# Patient Record
Sex: FEMALE | Race: BLACK OR AFRICAN AMERICAN | Employment: FULL TIME | URBAN - METROPOLITAN AREA
[De-identification: names, ages, dates, MRNs, and addresses within clinical notes are randomized per-mention and may not be internally consistent; named-entity substitution may affect disease eponyms.]

---

## 2021-10-28 ENCOUNTER — THERAPY VISIT (OUTPATIENT)
Dept: PHYSICAL THERAPY | Facility: CLINIC | Age: 61
End: 2021-10-28
Payer: COMMERCIAL

## 2021-10-28 DIAGNOSIS — M54.50 ACUTE MIDLINE LOW BACK PAIN WITHOUT SCIATICA: ICD-10-CM

## 2021-10-28 PROCEDURE — 97112 NEUROMUSCULAR REEDUCATION: CPT | Mod: GP | Performed by: PHYSICAL THERAPIST

## 2021-10-28 PROCEDURE — 97110 THERAPEUTIC EXERCISES: CPT | Mod: GP | Performed by: PHYSICAL THERAPIST

## 2021-10-28 PROCEDURE — 97161 PT EVAL LOW COMPLEX 20 MIN: CPT | Mod: GP | Performed by: PHYSICAL THERAPIST

## 2021-10-28 NOTE — LETTER
[facility]  [address]  [facility] [address]  [phone]    2021    Re: Ward Adhikari   :   1960  MRN:  [id number]   REFERRING PHYSICIAN:   SHWETA Pugh [facility]  Date of Initial Evaluation:  10/28/2021  Visits:  Rxs Used: 1  Reason for Referral:  Acute midline low back pain without sciatica    EVALUATION SUMMARY    Physical Therapy Initial Evaluation  Subjective:  The history is provided by the patient. No  was used.   Patient Health History  Ward Adhikari being seen for Lower backache.     Problem began: 2021 (MD order).   Problem occurred: Out of nowhere   Pain is reported as 0/10 on pain scale.  General health as reported by patient is good.  Pertinent medical history includes: none.   Red flags:  None as reported by patient.  Medical allergies: none.   Surgeries include:  None.    Current medications:  Other. Other medications details: previously was taking Acetametaphin (7-10 days; prescribed by  at [facility]).    Current occupation is .   Primary job tasks include:  Computer work, prolonged sitting and prolonged standing.                Therapist Generated HPI Evaluation  Problem details: Patient stated the pain started on 2021 for no reason.  Denies history of any previous back pain..         Type of problem:  Lumbar.    This is a chronic condition.  Condition occurred with:  Insidious onset.  Where condition occurred: for unknown reasons.  Patient reports pain:  Lumbar spine right (when she had pain).  Pain is described as other (maybe some tightness) and is intermittent.  Pain radiates to:  Gluteals right (it was shooting down- stopped after Medication). Pain is worse during the day.  Since onset symptoms are rapidly improving.  Associated symptoms:  Loss of motion/stiffness (when it happens). Symptoms are  exacerbated by other (transfers)  and relieved by heat, activity/movement, rest and NSAID's.  Re: Ward Adhikari   :   1960    Imaging testing: none.  Past treatment: none.   Restrictions due to condition include:  Working in normal job without restrictions.  Barriers include:  None as reported by patient.    Objective:  Standing Alignment:    Cervical/Thoracic:  Forward head  Shoulder/UE:  Rounded shoulders  Lumbar:  Normal       Lumbar/SI Evaluation  ROM:    AROM Lumbar:   Flexion:            Touches toes; repeat FIS= no pain; improvement in flexibility  Ext:                    WNL   Side Bend:        Left:  WNL    Right:  WNL  Rotation:           Left:  WNL    Right:  WNL  Side Glide:        Left:     Right:         Lumbar Myotomes:  normal  Lumbar DTR's:  not assessed  Lumbar Dermtomes:  not assessed  Neural Tension/Mobility:  Lumbar:  Normal    Lumbar Palpation:  normal    Assessment/Plan:    Patient is a 61 year old female with lumbar complaints.    Patient has the following significant findings with corresponding treatment plan.                Diagnosis 1:  LBP  Impaired muscle performance - home program  Decreased function - home program    Therapy Evaluation Codes:   1) History comprised of:   Personal factors that impact the plan of care:      None.    Comorbidity factors that impact the plan of care are:      None.     Medications impacting care: None.  2) Examination of Body Systems comprised of:   Body structures and functions that impact the plan of care:      Lumbar spine.   Activity limitations that impact the plan of care are:      Sitting.  3) Clinical presentation characteristics are:   Stable/Uncomplicated.  4) Decision-Making    Low complexity using standardized patient assessment instrument and/or measureable assessment of functional outcome.  Cumulative Therapy Evaluation is: Low complexity.    Previous and current functional limitations:  (See Goal Flow Sheet for this information)     Short term and Long term goals: (See Goal Flow Sheet for this information)     Communication ability:  Patient appears to be able to clearly communicate and understand verbal and written communication and follow directions correctly.  Treatment Explanation - The following has been discussed with the patient:   RX ordered/plan of care  Anticipated outcomes  Possible risks and side effects  This patient would benefit from PT intervention to resume normal activities.   Rehab potential is excellent.    Frequency:  1x for initial evaluation at this time  Duration:  For 1 visit at this time  Discharge Plan:  Achieve all LTG.  Independent in home treatment program.  Reach maximal therapeutic benefit.    Please refer to the daily flowsheet for treatment today, total treatment time and time spent performing 1:1 timed codes.           Thank you for your referral.    INQUIRIES  Therapist: SPENCER Rouse  [facility]  [address]  [facility] [address]  Phone: [phone]  Fax: [phone]

## 2021-10-28 NOTE — PROGRESS NOTES
Physical Therapy Initial Evaluation  Subjective:  The history is provided by the patient. No  was used.   Patient Health History  Ward Adhikari being seen for Lower backache.     Problem began: 9/29/2021 (MD order).   Problem occurred: Out of nowhere   Pain is reported as 0/10 on pain scale.  General health as reported by patient is good.  Pertinent medical history includes: none.   Red flags:  None as reported by patient.  Medical allergies: none.   Surgeries include:  None.    Current medications:  Other. Other medications details: previously was taking Acetametaphin (7-10 days; prescribed by  at [place]).    Current occupation is .   Primary job tasks include:  Computer work, prolonged sitting and prolonged standing.                  Therapist Generated HPI Evaluation  Problem details: Patient stated the pain started on 9/18/2021 for no reason.  Denies history of any previous back pain..         Type of problem:  Lumbar.    This is a chronic condition.  Condition occurred with:  Insidious onset.  Where condition occurred: for unknown reasons.  Patient reports pain:  Lumbar spine right (when she had pain).  Pain is described as other (maybe some tightness) and is intermittent.  Pain radiates to:  Gluteals right (it was shooting down- stopped after Medication). Pain is worse during the day.  Since onset symptoms are rapidly improving.  Associated symptoms:  Loss of motion/stiffness (when it happens). Symptoms are exacerbated by other (transfers)  and relieved by heat, activity/movement, rest and NSAID's.  Imaging testing: none.  Past treatment: none.   Restrictions due to condition include:  Working in normal job without restrictions.  Barriers include:  None as reported by patient.                        Objective:  Standing Alignment:    Cervical/Thoracic:  Forward head  Shoulder/UE:  Rounded shoulders  Lumbar:  Normal                           Lumbar/SI Evaluation  ROM:     AROM Lumbar:   Flexion:            Touches toes; repeat FIS= no pain; improvement in flexibility  Ext:                    WNL   Side Bend:        Left:  WNL    Right:  WNL  Rotation:           Left:  WNL    Right:  WNL  Side Glide:        Left:     Right:           Lumbar Myotomes:  normal            Lumbar DTR's:  not assessed        Lumbar Dermtomes:  not assessed                Neural Tension/Mobility:  Lumbar:  Normal        Lumbar Palpation:  normal                                                         General     ROS    Assessment/Plan:    Patient is a 61 year old female with lumbar complaints.    Patient has the following significant findings with corresponding treatment plan.                Diagnosis 1:  LBP  Impaired muscle performance - home program  Decreased function - home program    Therapy Evaluation Codes:   1) History comprised of:   Personal factors that impact the plan of care:      None.    Comorbidity factors that impact the plan of care are:      None.     Medications impacting care: None.  2) Examination of Body Systems comprised of:   Body structures and functions that impact the plan of care:      Lumbar spine.   Activity limitations that impact the plan of care are:      Sitting.  3) Clinical presentation characteristics are:   Stable/Uncomplicated.  4) Decision-Making    Low complexity using standardized patient assessment instrument and/or measureable assessment of functional outcome.  Cumulative Therapy Evaluation is: Low complexity.    Previous and current functional limitations:  (See Goal Flow Sheet for this information)    Short term and Long term goals: (See Goal Flow Sheet for this information)     Communication ability:  Patient appears to be able to clearly communicate and understand verbal and written communication and follow directions correctly.  Treatment Explanation - The following has been discussed with the patient:   RX ordered/plan of care  Anticipated  outcomes  Possible risks and side effects  This patient would benefit from PT intervention to resume normal activities.   Rehab potential is excellent.    Frequency:  1x for initial evaluation at this time  Duration:  For 1 visit at this time  Discharge Plan:  Achieve all LTG.  Independent in home treatment program.  Reach maximal therapeutic benefit.    Please refer to the daily flowsheet for treatment today, total treatment time and time spent performing 1:1 timed codes.

## 2021-12-07 PROBLEM — M54.50 ACUTE MIDLINE LOW BACK PAIN WITHOUT SCIATICA: Status: RESOLVED | Noted: 2021-10-28 | Resolved: 2021-12-07

## 2021-12-12 ENCOUNTER — HEALTH MAINTENANCE LETTER (OUTPATIENT)
Age: 61
End: 2021-12-12

## 2022-11-20 ENCOUNTER — HEALTH MAINTENANCE LETTER (OUTPATIENT)
Age: 62
End: 2022-11-20

## 2022-12-24 ENCOUNTER — HEALTH MAINTENANCE LETTER (OUTPATIENT)
Age: 62
End: 2022-12-24

## 2023-12-05 ENCOUNTER — ANCILLARY PROCEDURE (OUTPATIENT)
Dept: MAMMOGRAPHY | Facility: CLINIC | Age: 63
End: 2023-12-05
Attending: PHYSICIAN ASSISTANT
Payer: COMMERCIAL

## 2023-12-05 DIAGNOSIS — Z12.31 VISIT FOR SCREENING MAMMOGRAM: ICD-10-CM

## 2023-12-05 PROCEDURE — 77063 BREAST TOMOSYNTHESIS BI: CPT | Mod: GC | Performed by: STUDENT IN AN ORGANIZED HEALTH CARE EDUCATION/TRAINING PROGRAM

## 2023-12-05 PROCEDURE — 77067 SCR MAMMO BI INCL CAD: CPT | Mod: GC | Performed by: STUDENT IN AN ORGANIZED HEALTH CARE EDUCATION/TRAINING PROGRAM

## 2025-01-04 ENCOUNTER — HEALTH MAINTENANCE LETTER (OUTPATIENT)
Age: 65
End: 2025-01-04